# Patient Record
Sex: MALE | Race: WHITE
[De-identification: names, ages, dates, MRNs, and addresses within clinical notes are randomized per-mention and may not be internally consistent; named-entity substitution may affect disease eponyms.]

---

## 2020-08-18 ENCOUNTER — HOSPITAL ENCOUNTER (EMERGENCY)
Dept: HOSPITAL 74 - FER | Age: 65
Discharge: HOME | End: 2020-08-18
Payer: COMMERCIAL

## 2020-08-18 VITALS — HEART RATE: 78 BPM | SYSTOLIC BLOOD PRESSURE: 120 MMHG | DIASTOLIC BLOOD PRESSURE: 78 MMHG

## 2020-08-18 VITALS — TEMPERATURE: 98 F

## 2020-08-18 VITALS — BODY MASS INDEX: 34 KG/M2

## 2020-08-18 DIAGNOSIS — K80.20: Primary | ICD-10-CM

## 2020-08-18 LAB
ALBUMIN SERPL-MCNC: 4.7 G/DL (ref 3.4–5)
ALP SERPL-CCNC: 104 U/L (ref 45–117)
ALT SERPL-CCNC: 120 U/L (ref 13–61)
ANION GAP SERPL CALC-SCNC: 9 MMOL/L (ref 8–16)
AST SERPL-CCNC: 149 U/L (ref 15–37)
BILIRUB SERPL-MCNC: 2.9 MG/DL (ref 0.2–1)
BUN SERPL-MCNC: 23 MG/DL (ref 7–18)
CALCIUM SERPL-MCNC: 9.2 MG/DL (ref 8.5–10)
CHLORIDE SERPL-SCNC: 104 MMOL/L (ref 98–107)
CO2 SERPL-SCNC: 25 MMOL/L (ref 21–32)
CREAT SERPL-MCNC: 1 MG/DL (ref 0.55–1.3)
DEPRECATED RDW RBC AUTO: 15.9 % (ref 11.9–15.9)
GLUCOSE SERPL-MCNC: 172 MG/DL (ref 74–106)
HCT VFR BLD CALC: 40.5 % (ref 35.4–49)
HGB BLD-MCNC: 13.3 GM/DL (ref 11.7–16.9)
LIPASE SERPL-CCNC: 69 U/L (ref 73–393)
MCH RBC QN AUTO: 28.7 PG (ref 25.7–33.7)
MCHC RBC AUTO-ENTMCNC: 32.7 G/DL (ref 32–35.9)
MCV RBC: 87.7 FL (ref 80–96)
PLATELET # BLD AUTO: 142 K/MM3 (ref 134–434)
PLATELET BLD QL SMEAR: ADEQUATE
PMV BLD: 8.4 FL (ref 7.5–11.1)
POTASSIUM SERPLBLD-SCNC: 4.4 MMOL/L (ref 3.5–5.1)
PROT SERPL-MCNC: 6.9 G/DL (ref 6.4–8.2)
RBC # BLD AUTO: 4.62 M/MM3 (ref 4–5.6)
SODIUM SERPL-SCNC: 138 MMOL/L (ref 136–145)
WBC # BLD AUTO: 24 K/MM3 (ref 4–10.8)

## 2020-08-18 PROCEDURE — 3E033NZ INTRODUCTION OF ANALGESICS, HYPNOTICS, SEDATIVES INTO PERIPHERAL VEIN, PERCUTANEOUS APPROACH: ICD-10-PCS

## 2020-08-18 PROCEDURE — 3E0337Z INTRODUCTION OF ELECTROLYTIC AND WATER BALANCE SUBSTANCE INTO PERIPHERAL VEIN, PERCUTANEOUS APPROACH: ICD-10-PCS

## 2020-08-18 PROCEDURE — 3E033GC INTRODUCTION OF OTHER THERAPEUTIC SUBSTANCE INTO PERIPHERAL VEIN, PERCUTANEOUS APPROACH: ICD-10-PCS

## 2020-08-18 NOTE — PDOC
Documentation entered by Farrukh Augustine SCRIBE, acting as scribe for 

Thad Johnson MD.








Thad Johnson MD:  This documentation has been prepared by the 

Davide milligan Xhesika, SCRIBE, under my direction and personally reviewed by me

in its entirety.  I confirm that the documentation accurately reflects all work,

treatment, procedures, and medical decision making performed by me.  





History of Present Illness





- General


Chief Complaint: Chest Pain


Stated Complaint: CHEST, ABD PAIN


Time Seen by Provider: 08/18/20 19:56


History Source: Patient


Exam Limitations: No Limitations





- History of Present Illness


Initial Comments: 





08/18/20 20:00


The patient is a 65 year old male, with a significant PMH of pre-DM, CAD, and 

HTN who presents to the emergency department with RUQ abdominal pain since this 

morning. Pt states he ate some cantaloupe and peanuts which he believes 

triggered his pain. Pt states his abdominal pain is worse when laying down, 

alleviated when sitting and burping. Pt reports associated SOB secondary to the 

pain. Pt states his last meal was salmon and meat lasagna around 2pm. 





PAST SURGICAL HISTORY:  no significant history





FAMILY HISTORY:  family history of diverticulitis 





SOCIAL HISTORY:  Pt lives with  family and is employed.





MEDICATIONS:  reviewed





ALLERGIES:  As per nursing notes





08/18/20 21:38


Assessment and plan: This is a 65-year-old male who comes in complaining of 

right upper quadrant abdominal pain since 10 AM this morning.  Patient has a 

history significant for CLL.





On exam patient was tender over his gallbladder and right upper quadrant.  

Gallbladder ultrasound was ordered in addition to labs.


Patient has a 24,000 white count however patient said for him that is not 

elevated secondary to his CLL


Patient's liver enzymes were also elevated it is unclear whether this may be 

secondary to a gallbladder problem or his CLL











Past History





- Medical History


Allergies/Adverse Reactions: 


                                    Allergies











Allergy/AdvReac Type Severity Reaction Status Date / Time


 


Penicillins Allergy  Rash Verified 08/18/20 19:51











Home Medications: 


Ambulatory Orders





Ascorbate Calcium [Vitamin C] 500 mg PO DAILY 08/18/20 


Aspirin [Aspirin EC] 81 mg PO DAILY 08/18/20 


Atorvastatin Ca [Lipitor] 80 mg PO HS 08/18/20 


Lisinopril 5 mg PO DAILY 08/18/20 


Metoprolol Succinate [Toprol Xl] 25 mg PO DAILY 08/18/20 


Vitamin B Complex 1 each PO DAILY 08/18/20 








Cancer: Yes (PROSTATE SEEDS)


Cardiac Disorders: Yes (CAD)


COPD: No


Diabetes: Yes (PREDIABETIC)





- Psycho-Social/Smoking History


Smoking History: Never smoked


Have you smoked in the past 12 months: No


Information on smoking cessation initiated: No





- Substance Abuse Hx (Audit-C & DAST Scrn)


How often the patient has a drink containing alcohol: Monthly or less


Score: In Men: 4 or > Positive; In Women: 3 or > Positive: 1


Screen Result (Pos requires Nsg. Audit-10AR): Negative


In the last yr the pt used illegal drug/Rx for NonMed reason: No


Score:  Yes response is considered Positive: 0


Screen Result (Positive result requires Nsg. DAST-10): Negative





**Review of Systems





- Review of Systems


Able to Perform ROS?: Yes


Comments:: 





08/18/20 20:01


General:  No fevers or chills, no weakness, no weight loss 


HEENT: No change in vision.  No sore throat,. No ear pain


CardioVascular:  No chest pain.+shortness of breath


Respiratory:No cough, or wheezing. 


Gastrointestinal:  no nausea, vomiting, diarrhea or constipation,  No rectal 

bleeding. +RUQ abdominal pain


Genitourinary:  No dysuria, hematuria, or frequency


Musculoskeletal:  No joint or muscle pain or swelling


Neurologic: No headache, vertigo, dizziness or loss of consciousness


Psychiatric: nor depression 


Skin: No rashes or easy bruising


Endocrine: no increased thirst or abnormal weight change


Allergic: no skin or latex allergy


All other systems reviewed and normal











*Physical Exam





- Vital Signs


                                Last Vital Signs











Temp Pulse Resp BP Pulse Ox


 


 98 F   62   18   146/81   98 


 


 08/18/20 19:50  08/18/20 19:50  08/18/20 19:50  08/18/20 19:50  08/18/20 19:50














- Physical Exam





08/18/20 20:06


General: Well-nourished well-developed individual. +moderate distress. +obese.


HEENT: Throat: Normal, tonsils normal, no erythema or exudate


Neck: Supple, no meningeal signs, no lymphadenopathy


Eyes::Pupils equal reactive and round, extraocular motion intact


Chest: Nontender to palpation 


Cardiac: S1-S2 normal, regular rate and rhythm, no murmurs rubs or gallops


Respiratory: Lungs clear to auscultation bilateral. 


Abdomen: +RUQ abdominal pain tenderness to palpation. +bowel sounds present but 

decreased. no guarding, no rebound. 


Extremities: Warm, dry, no cyanosis, clubbing, or edema


Skin: No rashes


Neuro: Alert and oriented x3, nonfocal exam, grossly intact, normal gait


Psych: Normal mood and affect











ED Treatment Course





- LABORATORY


CBC & Chemistry Diagram: 


                                 08/18/20 20:10





                                 08/18/20 20:10





Discharge





- Discharge Information


Problems reviewed: Yes


Clinical Impression/Diagnosis: 


 Gallstones





Condition: Guarded


Disposition: HOME





- Admission


No





- Follow up/Referral


Referrals: 


ON STAFF,NOT [Primary Care Provider] - 





- Patient Discharge Instructions


Additional Instructions: 


Follow-up with your primary care doctor for a referral to a surgeon to have your

gallbladder removed.





Adhere to a no fat diet until you have your gallbladder removed as any fat in 

your diet can cause the gallbladder to flareup





Return to the emergency department immediately with ANY new, persistent or 

worsening symptoms.





Continue any medications as previously prescribed by your physician.





You should follow up with your primary doctor as soon as possible regarding 

today's emergency department visit.


.


Please make sure your doctor reviews the results of your emergency evaluation.





Thank you for coming to the   Emergency Department today for your care. It was a

pleasure to see you today. Please note that your evaluation is INCOMPLETE until 

you  follow-up with your doctor. 





- Post Discharge Activity

## 2020-08-19 NOTE — EKG
Test Reason : 

Blood Pressure : ***/*** mmHG

Vent. Rate : 059 BPM     Atrial Rate : 059 BPM

   P-R Int : 152 ms          QRS Dur : 094 ms

    QT Int : 436 ms       P-R-T Axes : 043 -17 057 degrees

   QTc Int : 431 ms

 

SINUS BRADYCARDIA

INCOMPLETE RIGHT BUNDLE BRANCH BLOCK

CANNOT RULE OUT ANTEROSEPTAL INFARCT , AGE UNDETERMINED

ABNORMAL ECG

NO PREVIOUS ECGS AVAILABLE

Confirmed by Mahesh Arriola MD ([id number]) on 8/19/2020 9:23:23 AM

 

Referred By: DR ESQUEDA           Confirmed By:Mahesh Arriola MD

## 2023-02-23 ENCOUNTER — HOSPITAL ENCOUNTER (EMERGENCY)
Dept: HOSPITAL 74 - FER | Age: 68
Discharge: HOME | End: 2023-02-23
Payer: COMMERCIAL

## 2023-02-23 VITALS
SYSTOLIC BLOOD PRESSURE: 133 MMHG | DIASTOLIC BLOOD PRESSURE: 78 MMHG | RESPIRATION RATE: 18 BRPM | HEART RATE: 60 BPM | TEMPERATURE: 99.2 F

## 2023-02-23 VITALS — BODY MASS INDEX: 32.5 KG/M2
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
## 2023-02-23 DIAGNOSIS — S62.102A: Primary | ICD-10-CM

## 2024-09-01 ENCOUNTER — HOSPITAL ENCOUNTER (OUTPATIENT)
Dept: HOSPITAL 74 - FER | Age: 69
Setting detail: OBSERVATION
LOS: 1 days | Discharge: LEFT BEFORE DISCHARGE | End: 2024-09-02
Attending: GENERAL ACUTE CARE HOSPITAL | Admitting: GENERAL ACUTE CARE HOSPITAL
Payer: COMMERCIAL

## 2024-09-01 VITALS — RESPIRATION RATE: 16 BRPM

## 2024-09-01 VITALS — BODY MASS INDEX: 34.7 KG/M2

## 2024-09-01 DIAGNOSIS — C91.10: ICD-10-CM

## 2024-09-01 DIAGNOSIS — Z95.810: ICD-10-CM

## 2024-09-01 DIAGNOSIS — I25.10: ICD-10-CM

## 2024-09-01 DIAGNOSIS — Z85.46: ICD-10-CM

## 2024-09-01 DIAGNOSIS — E78.5: ICD-10-CM

## 2024-09-01 DIAGNOSIS — Z79.01: ICD-10-CM

## 2024-09-01 DIAGNOSIS — R07.9: ICD-10-CM

## 2024-09-01 DIAGNOSIS — I11.9: Primary | ICD-10-CM

## 2024-09-01 DIAGNOSIS — Z88.0: ICD-10-CM

## 2024-09-01 LAB
ALBUMIN SERPL-MCNC: 4.8 G/DL (ref 3.4–5)
ALP SERPL-CCNC: 65 U/L (ref 45–117)
ALT SERPL-CCNC: 18 U/L (ref 7–52)
ANION GAP SERPL CALC-SCNC: 7 MMOL/L (ref 4–13)
ANISOCYTOSIS BLD QL: (no result)
AST SERPL-CCNC: 22 U/L (ref 15–37)
BILIRUB SERPL-MCNC: 0.9 MG/DL (ref 0.2–1)
BUN SERPL-MCNC: 24 MG/DL (ref 7–18)
CALCIUM SERPL-MCNC: 9.9 MG/DL (ref 8.5–10.1)
CHLORIDE SERPL-SCNC: 105 MMOL/L (ref 98–107)
CO2 SERPL-SCNC: 27 MMOL/L (ref 21–32)
CREAT SERPL-MCNC: 1 MG/DL (ref 0.6–1.3)
DEPRECATED RDW RBC AUTO: 18.3 % (ref 11.9–15.9)
GLUCOSE SERPL-MCNC: 103 MG/DL (ref 74–106)
HCT VFR BLD CALC: 39.3 % (ref 35.4–49)
HGB BLD-MCNC: 11.9 G/DL (ref 11.7–16.9)
INR BLD: 1.21 (ref 0.83–1.09)
MCH RBC QN AUTO: 27.4 PG (ref 25.7–33.7)
MCHC RBC AUTO-ENTMCNC: 30.3 G/DL (ref 32–35.9)
MCV RBC: 90.5 FL (ref 80–96)
OVALOCYTES BLD QL SMEAR: (no result)
PLATELET # BLD AUTO: 100.6 10^3/UL (ref 134–434)
PLATELET BLD QL SMEAR: (no result)
PMV BLD: 10.4 FL (ref 7.5–11.1)
POTASSIUM SERPLBLD-SCNC: 4.5 MMOL/L (ref 3.5–5.1)
PROT SERPL-MCNC: 6.8 G/DL (ref 6.4–8.2)
PT PNL PPP: 13.7 SEC (ref 9.7–13)
RBC # BLD AUTO: 4.34 10^6/UL (ref 4–5.6)
SMUDGE CELLS BLD QL SMEAR: (no result)
SODIUM SERPL-SCNC: 139 MMOL/L (ref 136–145)
WBC # BLD AUTO: 91.4 10^3/UL (ref 4–10.8)

## 2024-09-01 PROCEDURE — G0378 HOSPITAL OBSERVATION PER HR: HCPCS

## 2024-09-01 RX ADMIN — APIXABAN SCH MG: 5 TABLET, FILM COATED ORAL at 21:21

## 2024-09-01 RX ADMIN — ASPIRIN 81 MG ONE MG: 81 TABLET ORAL at 14:30

## 2024-09-01 RX ADMIN — SACUBITRIL AND VALSARTAN SCH TAB: 24; 26 TABLET, FILM COATED ORAL at 21:21

## 2024-09-01 RX ADMIN — EZETIMIBE SCH MG: 10 TABLET ORAL at 21:21

## 2024-09-01 RX ADMIN — ATORVASTATIN CALCIUM SCH MG: 80 TABLET, FILM COATED ORAL at 21:21

## 2024-09-02 VITALS — DIASTOLIC BLOOD PRESSURE: 58 MMHG | TEMPERATURE: 98.1 F | SYSTOLIC BLOOD PRESSURE: 99 MMHG | HEART RATE: 71 BPM

## 2024-09-02 LAB
ALBUMIN SERPL-MCNC: 4.4 G/DL (ref 3.4–5)
ALP SERPL-CCNC: 55 U/L (ref 45–117)
ALT SERPL-CCNC: 19 U/L (ref 7–52)
ANION GAP SERPL CALC-SCNC: 8 MMOL/L (ref 4–13)
ANISOCYTOSIS BLD QL: (no result)
AST SERPL-CCNC: 22 U/L (ref 15–37)
BILIRUB SERPL-MCNC: 1.1 MG/DL (ref 0.2–1)
BNP SERPL-MCNC: 505.1 PG/ML (ref 5–125)
BUN SERPL-MCNC: 19 MG/DL (ref 7–18)
CALCIUM SERPL-MCNC: 9.7 MG/DL (ref 8.5–10.1)
CHLORIDE SERPL-SCNC: 106 MMOL/L (ref 98–107)
CHOLEST SERPL-MCNC: 117 MG/DL (ref 50–200)
CO2 SERPL-SCNC: 28 MMOL/L (ref 21–32)
CREAT SERPL-MCNC: 1 MG/DL (ref 0.6–1.3)
DEPRECATED RDW RBC AUTO: 17.7 % (ref 11.9–15.9)
GLUCOSE SERPL-MCNC: 129 MG/DL (ref 74–106)
HCT VFR BLD CALC: 36.3 % (ref 35.4–49)
HDLC SERPL-MCNC: 24 MG/DL (ref 40–60)
HGB BLD-MCNC: 11.1 G/DL (ref 11.7–16.9)
LDLC SERPL CALC-MCNC: 61 MG/DL (ref 5–100)
MCH RBC QN AUTO: 27.7 PG (ref 25.7–33.7)
MCHC RBC AUTO-ENTMCNC: 30.6 G/DL (ref 32–35.9)
MCV RBC: 90.4 FL (ref 80–96)
OVALOCYTES BLD QL SMEAR: (no result)
PLATELET # BLD AUTO: 80.4 10^3/UL (ref 134–434)
PLATELET BLD QL SMEAR: (no result)
PMV BLD: 10.5 FL (ref 7.5–11.1)
POTASSIUM SERPLBLD-SCNC: 4.3 MMOL/L (ref 3.5–5.1)
PROT SERPL-MCNC: 6.1 G/DL (ref 6.4–8.2)
RBC # BLD AUTO: 4.01 10^6/UL (ref 4–5.6)
SMUDGE CELLS BLD QL SMEAR: (no result)
SODIUM SERPL-SCNC: 142 MMOL/L (ref 136–145)
WBC # BLD AUTO: 92.6 10^3/UL (ref 4–10.8)